# Patient Record
Sex: FEMALE | URBAN - METROPOLITAN AREA
[De-identification: names, ages, dates, MRNs, and addresses within clinical notes are randomized per-mention and may not be internally consistent; named-entity substitution may affect disease eponyms.]

---

## 2021-07-30 ENCOUNTER — TELEPHONE ENCOUNTER (OUTPATIENT)
Dept: URBAN - METROPOLITAN AREA CLINIC 72 | Facility: CLINIC | Age: 66
End: 2021-07-30

## 2021-08-02 ENCOUNTER — TELEPHONE ENCOUNTER (OUTPATIENT)
Dept: URBAN - METROPOLITAN AREA CLINIC 72 | Facility: CLINIC | Age: 66
End: 2021-08-02

## 2021-08-03 ENCOUNTER — WEB ENCOUNTER (OUTPATIENT)
Dept: URBAN - METROPOLITAN AREA CLINIC 72 | Facility: CLINIC | Age: 66
End: 2021-08-03

## 2021-08-03 ENCOUNTER — DASHBOARD ENCOUNTERS (OUTPATIENT)
Age: 66
End: 2021-08-03

## 2021-08-03 ENCOUNTER — OFFICE VISIT (OUTPATIENT)
Dept: URBAN - METROPOLITAN AREA CLINIC 72 | Facility: CLINIC | Age: 66
End: 2021-08-03
Payer: MEDICARE

## 2021-08-03 VITALS
WEIGHT: 128 LBS | TEMPERATURE: 97.5 F | SYSTOLIC BLOOD PRESSURE: 115 MMHG | HEIGHT: 63 IN | HEART RATE: 85 BPM | BODY MASS INDEX: 22.68 KG/M2 | DIASTOLIC BLOOD PRESSURE: 81 MMHG

## 2021-08-03 DIAGNOSIS — K21.9 GASTROESOPHAGEAL REFLUX DISEASE, UNSPECIFIED WHETHER ESOPHAGITIS PRESENT: ICD-10-CM

## 2021-08-03 DIAGNOSIS — Z12.11 COLON CANCER SCREENING: ICD-10-CM

## 2021-08-03 PROCEDURE — 99203 OFFICE O/P NEW LOW 30 MIN: CPT | Performed by: INTERNAL MEDICINE

## 2021-08-03 RX ORDER — LETROZOLE 2.5 MG/1
1 TABLET TABLET, FILM COATED ORAL ONCE A DAY
Status: ACTIVE | COMMUNITY

## 2021-08-03 NOTE — HPI-TODAY'S VISIT:
Mrs. Coats is a 66-year-old female who presents as an inpatient for consultation colon cancer screening, she was referred by Dr. Oriana North. She reports issues with GERD.  Initially made dietary and lifestyle modifications and medication as needed however recently she has had to take to Nexium twice a day due to breakthrough symptoms of burning in her midesophagus.  She notes a history of ulcers related to aspirin use and states this feels different.  She does have a discomfort and bandlike pressure across her abdomen.  She denies any weight loss.  Does endorse intermittent dysphagia with cold liquids.  Would like to arrange colonoscopy as well, last 5-6 years ago, believe she may have had polyps but unsure.

## 2021-08-25 ENCOUNTER — OFFICE VISIT (OUTPATIENT)
Dept: URBAN - METROPOLITAN AREA MEDICAL CENTER 40 | Facility: MEDICAL CENTER | Age: 66
End: 2021-08-25

## 2021-09-09 ENCOUNTER — OFFICE VISIT (OUTPATIENT)
Dept: URBAN - METROPOLITAN AREA CLINIC 72 | Facility: CLINIC | Age: 66
End: 2021-09-09

## 2021-09-20 ENCOUNTER — OFFICE VISIT (OUTPATIENT)
Dept: URBAN - METROPOLITAN AREA MEDICAL CENTER 40 | Facility: MEDICAL CENTER | Age: 66
End: 2021-09-20
Payer: MEDICARE

## 2021-09-20 DIAGNOSIS — K21.9 ACID REFLUX RESOLVED ON PPI: ICD-10-CM

## 2021-09-20 DIAGNOSIS — D12.4 ADENOMA OF DESCENDING COLON: ICD-10-CM

## 2021-09-20 DIAGNOSIS — Z86.010 COLON POLYP HISTORY: ICD-10-CM

## 2021-09-20 PROCEDURE — 45380 COLONOSCOPY AND BIOPSY: CPT | Performed by: INTERNAL MEDICINE

## 2021-09-20 PROCEDURE — 43239 EGD BIOPSY SINGLE/MULTIPLE: CPT | Performed by: INTERNAL MEDICINE

## 2021-10-05 ENCOUNTER — TELEPHONE ENCOUNTER (OUTPATIENT)
Dept: URBAN - METROPOLITAN AREA CLINIC 113 | Facility: CLINIC | Age: 66
End: 2021-10-05

## 2021-10-13 ENCOUNTER — OFFICE VISIT (OUTPATIENT)
Dept: URBAN - METROPOLITAN AREA CLINIC 72 | Facility: CLINIC | Age: 66
End: 2021-10-13

## 2021-10-22 ENCOUNTER — OFFICE VISIT (OUTPATIENT)
Dept: URBAN - METROPOLITAN AREA MEDICAL CENTER 40 | Facility: MEDICAL CENTER | Age: 66
End: 2021-10-22

## 2022-06-01 ENCOUNTER — ESTABLISHED PATIENT (OUTPATIENT)
Dept: URBAN - METROPOLITAN AREA CLINIC 20 | Facility: CLINIC | Age: 67
End: 2022-06-01

## 2022-06-01 DIAGNOSIS — H18.832: ICD-10-CM

## 2022-06-01 PROCEDURE — 99213 OFFICE O/P EST LOW 20 MIN: CPT

## 2022-06-01 ASSESSMENT — TONOMETRY
OD_IOP_MMHG: 9
OS_IOP_MMHG: 14

## 2022-06-01 ASSESSMENT — VISUAL ACUITY
OS_SC: 20/25
OD_SC: 20/30+2
OU_SC: 20/25-1

## 2022-12-14 ENCOUNTER — ESTABLISHED PATIENT (OUTPATIENT)
Dept: URBAN - METROPOLITAN AREA CLINIC 20 | Facility: CLINIC | Age: 67
End: 2022-12-14

## 2022-12-14 PROCEDURE — 92015 DETERMINE REFRACTIVE STATE: CPT

## 2022-12-14 PROCEDURE — 92014 COMPRE OPH EXAM EST PT 1/>: CPT

## 2022-12-14 ASSESSMENT — KERATOMETRY
OD_K2POWER_DIOPTERS: 41.50
OD_AXISANGLE2_DEGREES: 116
OS_K1POWER_DIOPTERS: 41.25
OS_AXISANGLE_DEGREES: 136
OS_K2POWER_DIOPTERS: 41.75
OS_AXISANGLE2_DEGREES: 46
OD_K1POWER_DIOPTERS: 40.75
OD_AXISANGLE_DEGREES: 26

## 2022-12-14 ASSESSMENT — VISUAL ACUITY
OD_SC: 20/30-1
OS_SC: 20/25
OU_SC: J2
OU_SC: 20/25+2

## 2022-12-14 ASSESSMENT — TONOMETRY
OS_IOP_MMHG: 13
OD_IOP_MMHG: 12

## 2023-06-14 ENCOUNTER — FOLLOW UP (OUTPATIENT)
Dept: URBAN - METROPOLITAN AREA CLINIC 20 | Facility: CLINIC | Age: 68
End: 2023-06-14

## 2023-06-14 DIAGNOSIS — H35.363: ICD-10-CM

## 2023-06-14 PROCEDURE — 92134 CPTRZ OPH DX IMG PST SGM RTA: CPT

## 2023-06-14 PROCEDURE — 99213 OFFICE O/P EST LOW 20 MIN: CPT

## 2023-06-14 ASSESSMENT — TONOMETRY
OS_IOP_MMHG: 16
OD_IOP_MMHG: 13

## 2023-06-14 ASSESSMENT — VISUAL ACUITY
OS_SC: 20/20-2
OU_SC: 20/20-2
OD_SC: 20/30-1

## 2023-12-13 ENCOUNTER — COMPREHENSIVE EXAM (OUTPATIENT)
Dept: URBAN - METROPOLITAN AREA CLINIC 20 | Facility: CLINIC | Age: 68
End: 2023-12-13

## 2023-12-13 DIAGNOSIS — H35.363: ICD-10-CM

## 2023-12-13 PROCEDURE — 92014 COMPRE OPH EXAM EST PT 1/>: CPT

## 2023-12-13 ASSESSMENT — KERATOMETRY
OS_K2POWER_DIOPTERS: 41.75
OS_AXISANGLE2_DEGREES: 36
OS_AXISANGLE_DEGREES: 126
OD_K2POWER_DIOPTERS: 41.75
OD_K1POWER_DIOPTERS: 41.00
OD_AXISANGLE_DEGREES: 18
OD_AXISANGLE2_DEGREES: 108
OS_K1POWER_DIOPTERS: 41.25

## 2023-12-13 ASSESSMENT — VISUAL ACUITY
OS_SC: 20/20
OU_SC: 20/20
OU_SC: J3
OD_SC: 20/30-1

## 2023-12-13 ASSESSMENT — TONOMETRY
OS_IOP_MMHG: 16
OD_IOP_MMHG: 14

## 2024-10-09 ENCOUNTER — FOLLOW UP (OUTPATIENT)
Dept: URBAN - METROPOLITAN AREA CLINIC 20 | Facility: CLINIC | Age: 69
End: 2024-10-09

## 2024-10-09 DIAGNOSIS — H35.3131: ICD-10-CM

## 2024-10-09 DIAGNOSIS — H25.813: ICD-10-CM

## 2024-10-09 DIAGNOSIS — H16.223: ICD-10-CM

## 2024-10-09 PROCEDURE — 92134 CPTRZ OPH DX IMG PST SGM RTA: CPT

## 2024-10-09 PROCEDURE — 92014 COMPRE OPH EXAM EST PT 1/>: CPT

## 2025-04-09 ENCOUNTER — COMPREHENSIVE EXAM (OUTPATIENT)
Age: 70
End: 2025-04-09

## 2025-04-09 DIAGNOSIS — H52.4: ICD-10-CM

## 2025-04-09 DIAGNOSIS — H16.223: ICD-10-CM

## 2025-04-09 DIAGNOSIS — H35.3131: ICD-10-CM

## 2025-04-09 DIAGNOSIS — H25.813: ICD-10-CM

## 2025-04-09 PROCEDURE — 92134 CPTRZ OPH DX IMG PST SGM RTA: CPT

## 2025-04-09 PROCEDURE — 92012 INTRM OPH EXAM EST PATIENT: CPT

## 2025-04-09 PROCEDURE — 92015 DETERMINE REFRACTIVE STATE: CPT
